# Patient Record
Sex: MALE
[De-identification: names, ages, dates, MRNs, and addresses within clinical notes are randomized per-mention and may not be internally consistent; named-entity substitution may affect disease eponyms.]

---

## 2022-08-25 ENCOUNTER — RESULT CHARGE (OUTPATIENT)
Age: 47
End: 2022-08-25

## 2022-08-25 ENCOUNTER — APPOINTMENT (OUTPATIENT)
Dept: ORTHOPEDIC SURGERY | Facility: CLINIC | Age: 47
End: 2022-08-25
Payer: COMMERCIAL

## 2022-08-25 VITALS — BODY MASS INDEX: 26.6 KG/M2 | WEIGHT: 190 LBS | HEIGHT: 71 IN

## 2022-08-25 DIAGNOSIS — Z78.9 OTHER SPECIFIED HEALTH STATUS: ICD-10-CM

## 2022-08-25 PROCEDURE — 99204 OFFICE O/P NEW MOD 45 MIN: CPT

## 2022-08-25 PROCEDURE — 73130 X-RAY EXAM OF HAND: CPT | Mod: RT

## 2022-08-25 PROCEDURE — 99203 OFFICE O/P NEW LOW 30 MIN: CPT

## 2022-08-25 NOTE — HISTORY OF PRESENT ILLNESS
[de-identified] : The patient is a 47 year old RT hand dominant male who presents today complaining of RIGHT hand  \par Date of Injury/Onset:  8/25/22\par Pain:    At Rest: 0/10 \par With Activity:  5/10 \par Mechanism of injury: Punched a door\par This is NOT a Work Related Injury being treated under Worker's Compensation.\par This is NOT an athletic injury occurring associated with an interscholastic or organized sports team.\par Quality of symptoms:  Numbness/ Sharp/ Shooting \par Improves with: Ice\par Worse with: Movement \par Prior treatment: None\par Prior Imaging: None\par Out of work/sport: _, since _\par School/Sport/Position/Occupation: Business owner\par Additional Information: None

## 2022-08-25 NOTE — ASSESSMENT
[FreeTextEntry1] : Right 5th metacarpal fracture, neck, flexed - will manage with closed management\par \par Reviewed radiographs with patient. Discussed radiograph alignment is within acceptable parameters for closed management. NWB, Elevate. Discussed risk of pain, stiffness and displacement requiring further intervention. Also discussed risk of post-traumatic arthritis. Script for ulnar gutter splint provided.\par \par F/u 3weeks; repeat films; advance ROM

## 2022-08-25 NOTE — IMAGING
[de-identified] : Right hand with dorsal swelling. Skin intact. +ttp at 5th metacarpal, -ecchymosis. Able to make gentle fist with no rotational deformity. Sensation intact throughout. <2sec cap refill.\par \par Right hand radiographs with 5th metacarpal neck fracture, flexed.

## 2022-09-13 ENCOUNTER — APPOINTMENT (OUTPATIENT)
Dept: ORTHOPEDIC SURGERY | Facility: CLINIC | Age: 47
End: 2022-09-13

## 2022-09-13 VITALS — WEIGHT: 190 LBS | BODY MASS INDEX: 26.6 KG/M2 | HEIGHT: 71 IN

## 2022-09-13 DIAGNOSIS — Z78.9 OTHER SPECIFIED HEALTH STATUS: ICD-10-CM

## 2022-09-13 PROCEDURE — 99213 OFFICE O/P EST LOW 20 MIN: CPT

## 2022-09-13 PROCEDURE — 73130 X-RAY EXAM OF HAND: CPT | Mod: RT

## 2022-09-15 NOTE — IMAGING
[de-identified] : Right hand with improved swelling. Skin intact. Mild ttp at 5th metacarpal, -ecchymosis. Able to make gentle fist with no rotational deformity. Sensation intact throughout. <2sec cap refill.\par \par Right hand radiographs with 5th metacarpal neck fracture, flexed. Alignment maintained with +callus.

## 2022-09-15 NOTE — ASSESSMENT
[FreeTextEntry1] : Right 5th metacarpal fracture, neck, flexed - will manage with closed management\par \par Reviewed radiographs with patient. Discussed radiograph alignment is within acceptable parameters for closed management. NWB, Elevate. Discussed risk of pain, stiffness and displacement requiring further intervention. Also discussed risk of post-traumatic arthritis. Ease out of ulnar gutter splint. OT for ROM. NWB.\par \par F/u 4weeks; repeat films; advance strenthening

## 2022-09-15 NOTE — HISTORY OF PRESENT ILLNESS
[de-identified] : The patient is a 47 year old RT hand dominant male who presents today complaining of RIGHT hand  \par Date of Injury/Onset:  8/25/22\par Pain:    At Rest: 0/10 \par With Activity:  5/10 \par Mechanism of injury: Punched a door\par This is NOT a Work Related Injury being treated under Worker's Compensation.\par This is NOT an athletic injury occurring associated with an interscholastic or organized sports team.\par Quality of symptoms:  Numbness/ Sharp/ Shooting \par Improves with: Ice\par Worse with: Movement \par Prior treatment: None\par Prior Imaging: None\par Out of work/sport: _, since _\par School/Sport/Position/Occupation: Business owner\par Additional Information: None\par \par 9/13/22: f/u right hand . Reports pain has subsided in the last 3 days. Reports will get pain in the wrist if the brace is off. Admits to wearing brace 24/7. Only takes it off to shower. Denies numbness/tingling. No constitutional symptoms.

## 2022-10-11 ENCOUNTER — APPOINTMENT (OUTPATIENT)
Dept: ORTHOPEDIC SURGERY | Facility: CLINIC | Age: 47
End: 2022-10-11
Payer: COMMERCIAL

## 2022-10-11 DIAGNOSIS — S62.309A UNSPECIFIED FRACTURE OF UNSPECIFIED METACARPAL BONE, INITIAL ENCOUNTER FOR CLOSED FRACTURE: ICD-10-CM

## 2022-10-11 PROCEDURE — 73130 X-RAY EXAM OF HAND: CPT | Mod: RT

## 2022-10-11 PROCEDURE — 99213 OFFICE O/P EST LOW 20 MIN: CPT

## 2022-10-11 NOTE — HISTORY OF PRESENT ILLNESS
[de-identified] : The patient is a 47 year old RT hand dominant male who presents today complaining of RIGHT hand  \par Date of Injury/Onset:  8/25/22\par Pain:    At Rest: 0/10 \par With Activity:  5/10 \par Mechanism of injury: Punched a door\par This is NOT a Work Related Injury being treated under Worker's Compensation.\par This is NOT an athletic injury occurring associated with an interscholastic or organized sports team.\par Quality of symptoms:  Numbness/ Sharp/ Shooting \par Improves with: Ice\par Worse with: Movement \par Prior treatment: None\par Prior Imaging: None\par Out of work/sport: _, since _\par School/Sport/Position/Occupation: Business owner\par Additional Information: None\par \par 9/13/22: f/u right hand . Reports pain has subsided in the last 3 days. Reports will get pain in the wrist if the brace is off. Admits to wearing brace 24/7. Only takes it off to shower. Denies numbness/tingling. No constitutional symptoms. \par \par 10/11/22: f/u right hand. Reports pain is gone, denies numbness/tingling. Good ROM of fingers. d/c use of brace. No constitutional symptoms.

## 2022-10-11 NOTE — IMAGING
[de-identified] : Right hand with improved swelling. Skin intact. Mild ttp at 5th metacarpal, -ecchymosis. Able to make full fist with no rotational deformity. Sensation intact throughout. <2sec cap refill.\par \par Right hand radiographs with 5th metacarpal neck fracture, flexed. Alignment maintained with +callus.

## 2022-10-11 NOTE — ASSESSMENT
[FreeTextEntry1] : Right 5th metacarpal fracture, neck, flexed - will cotninue to manage with closed management\par \par Reviewed radiographs with patient. Discussed radiograph alignment is within acceptable parameters for closed management. NWB, Elevate. Discussed risk of pain, stiffness and displacement requiring further intervention. Also discussed risk of post-traumatic arthritis. WBAT, OT for strengthening.\par \par F/u 12weeks; repeat films; advance strengthening

## 2024-10-17 ENCOUNTER — EMERGENCY (EMERGENCY)
Age: 49
LOS: 1 days | Discharge: LEFT BEFORE DISCHARGE | End: 2024-10-17
Admitting: EMERGENCY MEDICINE

## 2024-10-17 VITALS
SYSTOLIC BLOOD PRESSURE: 148 MMHG | TEMPERATURE: 98 F | HEART RATE: 73 BPM | DIASTOLIC BLOOD PRESSURE: 89 MMHG | RESPIRATION RATE: 18 BRPM | OXYGEN SATURATION: 100 %

## 2024-10-17 PROCEDURE — L9991: CPT

## 2024-10-17 NOTE — ED STATDOCS - OBJECTIVE STATEMENT
49-year-old male here for BH evaluation.  Patient was with his son who is also in behavioral health.  Father states that he just wanted to be checked by someone.  NKDA.  No daily meds.  Vaccines up-to-date.  History of ulcerative colitis.  History of UC surgery.  Here vital signs are stable.  He is awake and alert.  Heart–S1-S2 normal with no murmurs.  Lungs–CTA bilaterally.  Abdomen is soft.  Report given to the adult side for possible BH evaluation.  Patient then decided to leave AGAINST MEDICAL ADVICE.  Daina Barone MD